# Patient Record
Sex: FEMALE | Race: WHITE | NOT HISPANIC OR LATINO | Employment: FULL TIME | URBAN - METROPOLITAN AREA
[De-identification: names, ages, dates, MRNs, and addresses within clinical notes are randomized per-mention and may not be internally consistent; named-entity substitution may affect disease eponyms.]

---

## 2020-04-21 DIAGNOSIS — Z01.84 ANTIBODY RESPONSE EXAMINATION: ICD-10-CM

## 2020-04-27 ENCOUNTER — LAB VISIT (OUTPATIENT)
Dept: LAB | Facility: HOSPITAL | Age: 35
End: 2020-04-27
Attending: INTERNAL MEDICINE
Payer: COMMERCIAL

## 2020-04-27 DIAGNOSIS — Z01.84 ANTIBODY RESPONSE EXAMINATION: ICD-10-CM

## 2020-04-27 LAB — SARS-COV-2 IGG SERPL QL IA: NEGATIVE

## 2020-04-27 PROCEDURE — 36415 COLL VENOUS BLD VENIPUNCTURE: CPT

## 2020-04-27 PROCEDURE — 86769 SARS-COV-2 COVID-19 ANTIBODY: CPT

## 2020-07-02 ENCOUNTER — CLINICAL SUPPORT (OUTPATIENT)
Dept: URGENT CARE | Facility: CLINIC | Age: 35
End: 2020-07-02
Payer: COMMERCIAL

## 2020-07-02 VITALS
DIASTOLIC BLOOD PRESSURE: 78 MMHG | OXYGEN SATURATION: 99 % | TEMPERATURE: 99 F | RESPIRATION RATE: 16 BRPM | SYSTOLIC BLOOD PRESSURE: 129 MMHG | HEART RATE: 72 BPM

## 2020-07-02 DIAGNOSIS — R50.9 FEVER: ICD-10-CM

## 2020-07-02 PROCEDURE — 99211 PR OFFICE/OUTPT VISIT, EST, LEVL I: ICD-10-PCS | Mod: S$GLB,,, | Performed by: PHYSICIAN ASSISTANT

## 2020-07-02 PROCEDURE — 99211 OFF/OP EST MAY X REQ PHY/QHP: CPT | Mod: S$GLB,,, | Performed by: PHYSICIAN ASSISTANT

## 2020-07-02 PROCEDURE — U0003 INFECTIOUS AGENT DETECTION BY NUCLEIC ACID (DNA OR RNA); SEVERE ACUTE RESPIRATORY SYNDROME CORONAVIRUS 2 (SARS-COV-2) (CORONAVIRUS DISEASE [COVID-19]), AMPLIFIED PROBE TECHNIQUE, MAKING USE OF HIGH THROUGHPUT TECHNOLOGIES AS DESCRIBED BY CMS-2020-01-R: HCPCS

## 2020-07-02 NOTE — PROGRESS NOTES

## 2020-07-06 ENCOUNTER — TELEPHONE (OUTPATIENT)
Dept: URGENT CARE | Facility: CLINIC | Age: 35
End: 2020-07-06

## 2020-07-06 LAB — SARS-COV-2 RNA RESP QL NAA+PROBE: DETECTED

## 2020-07-06 NOTE — TELEPHONE ENCOUNTER
Called pt on behalf of [employer] to discuss COVID-19 result.  No answer, requested that patient call clinic back or call  re: to discuss COVID results

## 2021-05-06 ENCOUNTER — PATIENT MESSAGE (OUTPATIENT)
Dept: RESEARCH | Facility: HOSPITAL | Age: 36
End: 2021-05-06

## 2021-09-23 ENCOUNTER — OFFICE VISIT (OUTPATIENT)
Dept: FAMILY MEDICINE | Facility: CLINIC | Age: 36
End: 2021-09-23
Payer: COMMERCIAL

## 2021-09-23 ENCOUNTER — LAB VISIT (OUTPATIENT)
Dept: LAB | Facility: HOSPITAL | Age: 36
End: 2021-09-23
Attending: NURSE PRACTITIONER
Payer: COMMERCIAL

## 2021-09-23 VITALS
OXYGEN SATURATION: 96 % | HEIGHT: 62 IN | SYSTOLIC BLOOD PRESSURE: 106 MMHG | TEMPERATURE: 98 F | WEIGHT: 127.44 LBS | DIASTOLIC BLOOD PRESSURE: 64 MMHG | HEART RATE: 81 BPM | BODY MASS INDEX: 23.45 KG/M2

## 2021-09-23 DIAGNOSIS — Z76.89 ENCOUNTER TO ESTABLISH CARE: Primary | ICD-10-CM

## 2021-09-23 DIAGNOSIS — Z76.89 ENCOUNTER TO ESTABLISH CARE: ICD-10-CM

## 2021-09-23 LAB
ALBUMIN SERPL BCP-MCNC: 4.4 G/DL (ref 3.5–5.2)
ALP SERPL-CCNC: 41 U/L (ref 55–135)
ALT SERPL W/O P-5'-P-CCNC: 5 U/L (ref 10–44)
ANION GAP SERPL CALC-SCNC: 10 MMOL/L (ref 8–16)
AST SERPL-CCNC: 13 U/L (ref 10–40)
BASOPHILS # BLD AUTO: 0.1 K/UL (ref 0–0.2)
BASOPHILS NFR BLD: 1.5 % (ref 0–1.9)
BILIRUB SERPL-MCNC: 0.8 MG/DL (ref 0.1–1)
BUN SERPL-MCNC: 12 MG/DL (ref 6–20)
CALCIUM SERPL-MCNC: 9.6 MG/DL (ref 8.7–10.5)
CHLORIDE SERPL-SCNC: 103 MMOL/L (ref 95–110)
CHOLEST SERPL-MCNC: 164 MG/DL (ref 120–199)
CHOLEST SERPL-MCNC: 164 MG/DL (ref 120–199)
CHOLEST/HDLC SERPL: 2.4 {RATIO} (ref 2–5)
CHOLEST/HDLC SERPL: 2.4 {RATIO} (ref 2–5)
CO2 SERPL-SCNC: 24 MMOL/L (ref 23–29)
CREAT SERPL-MCNC: 0.8 MG/DL (ref 0.5–1.4)
DIFFERENTIAL METHOD: ABNORMAL
EOSINOPHIL # BLD AUTO: 0.2 K/UL (ref 0–0.5)
EOSINOPHIL NFR BLD: 2.3 % (ref 0–8)
ERYTHROCYTE [DISTWIDTH] IN BLOOD BY AUTOMATED COUNT: 12.6 % (ref 11.5–14.5)
EST. GFR  (AFRICAN AMERICAN): >60 ML/MIN/1.73 M^2
EST. GFR  (NON AFRICAN AMERICAN): >60 ML/MIN/1.73 M^2
GLUCOSE SERPL-MCNC: 83 MG/DL (ref 70–110)
HCT VFR BLD AUTO: 43 % (ref 37–48.5)
HDLC SERPL-MCNC: 67 MG/DL (ref 40–75)
HDLC SERPL-MCNC: 67 MG/DL (ref 40–75)
HDLC SERPL: 40.9 % (ref 20–50)
HDLC SERPL: 40.9 % (ref 20–50)
HGB BLD-MCNC: 13.2 G/DL (ref 12–16)
IMM GRANULOCYTES # BLD AUTO: 0.02 K/UL (ref 0–0.04)
IMM GRANULOCYTES NFR BLD AUTO: 0.3 % (ref 0–0.5)
LDLC SERPL CALC-MCNC: 90 MG/DL (ref 63–159)
LDLC SERPL CALC-MCNC: 90 MG/DL (ref 63–159)
LYMPHOCYTES # BLD AUTO: 1.7 K/UL (ref 1–4.8)
LYMPHOCYTES NFR BLD: 23.9 % (ref 18–48)
MCH RBC QN AUTO: 30.5 PG (ref 27–31)
MCHC RBC AUTO-ENTMCNC: 30.7 G/DL (ref 32–36)
MCV RBC AUTO: 99 FL (ref 82–98)
MONOCYTES # BLD AUTO: 0.6 K/UL (ref 0.3–1)
MONOCYTES NFR BLD: 8.9 % (ref 4–15)
NEUTROPHILS # BLD AUTO: 4.4 K/UL (ref 1.8–7.7)
NEUTROPHILS NFR BLD: 63.1 % (ref 38–73)
NONHDLC SERPL-MCNC: 97 MG/DL
NONHDLC SERPL-MCNC: 97 MG/DL
NRBC BLD-RTO: 0 /100 WBC
PLATELET # BLD AUTO: 328 K/UL (ref 150–450)
PMV BLD AUTO: 9.9 FL (ref 9.2–12.9)
POTASSIUM SERPL-SCNC: 4.5 MMOL/L (ref 3.5–5.1)
PROT SERPL-MCNC: 7.4 G/DL (ref 6–8.4)
RBC # BLD AUTO: 4.33 M/UL (ref 4–5.4)
SODIUM SERPL-SCNC: 137 MMOL/L (ref 136–145)
TRIGL SERPL-MCNC: 35 MG/DL (ref 30–150)
TRIGL SERPL-MCNC: 35 MG/DL (ref 30–150)
TSH SERPL DL<=0.005 MIU/L-ACNC: 0.78 UIU/ML (ref 0.4–4)
WBC # BLD AUTO: 6.89 K/UL (ref 3.9–12.7)

## 2021-09-23 PROCEDURE — 3074F PR MOST RECENT SYSTOLIC BLOOD PRESSURE < 130 MM HG: ICD-10-PCS | Mod: CPTII,S$GLB,, | Performed by: NURSE PRACTITIONER

## 2021-09-23 PROCEDURE — 99203 OFFICE O/P NEW LOW 30 MIN: CPT | Mod: S$GLB,,, | Performed by: NURSE PRACTITIONER

## 2021-09-23 PROCEDURE — 99999 PR PBB SHADOW E&M-EST. PATIENT-LVL III: ICD-10-PCS | Mod: PBBFAC,,, | Performed by: NURSE PRACTITIONER

## 2021-09-23 PROCEDURE — 1159F MED LIST DOCD IN RCRD: CPT | Mod: CPTII,S$GLB,, | Performed by: NURSE PRACTITIONER

## 2021-09-23 PROCEDURE — 80053 COMPREHEN METABOLIC PANEL: CPT | Performed by: NURSE PRACTITIONER

## 2021-09-23 PROCEDURE — 1159F PR MEDICATION LIST DOCUMENTED IN MEDICAL RECORD: ICD-10-PCS | Mod: CPTII,S$GLB,, | Performed by: NURSE PRACTITIONER

## 2021-09-23 PROCEDURE — 84443 ASSAY THYROID STIM HORMONE: CPT | Performed by: NURSE PRACTITIONER

## 2021-09-23 PROCEDURE — 3078F PR MOST RECENT DIASTOLIC BLOOD PRESSURE < 80 MM HG: ICD-10-PCS | Mod: CPTII,S$GLB,, | Performed by: NURSE PRACTITIONER

## 2021-09-23 PROCEDURE — 99999 PR PBB SHADOW E&M-EST. PATIENT-LVL III: CPT | Mod: PBBFAC,,, | Performed by: NURSE PRACTITIONER

## 2021-09-23 PROCEDURE — 80061 LIPID PANEL: CPT | Performed by: NURSE PRACTITIONER

## 2021-09-23 PROCEDURE — 3074F SYST BP LT 130 MM HG: CPT | Mod: CPTII,S$GLB,, | Performed by: NURSE PRACTITIONER

## 2021-09-23 PROCEDURE — 36415 COLL VENOUS BLD VENIPUNCTURE: CPT | Mod: PO | Performed by: NURSE PRACTITIONER

## 2021-09-23 PROCEDURE — 1160F PR REVIEW ALL MEDS BY PRESCRIBER/CLIN PHARMACIST DOCUMENTED: ICD-10-PCS | Mod: CPTII,S$GLB,, | Performed by: NURSE PRACTITIONER

## 2021-09-23 PROCEDURE — 85025 COMPLETE CBC W/AUTO DIFF WBC: CPT | Performed by: NURSE PRACTITIONER

## 2021-09-23 PROCEDURE — 3008F PR BODY MASS INDEX (BMI) DOCUMENTED: ICD-10-PCS | Mod: CPTII,S$GLB,, | Performed by: NURSE PRACTITIONER

## 2021-09-23 PROCEDURE — 99203 PR OFFICE/OUTPT VISIT, NEW, LEVL III, 30-44 MIN: ICD-10-PCS | Mod: S$GLB,,, | Performed by: NURSE PRACTITIONER

## 2021-09-23 PROCEDURE — 3078F DIAST BP <80 MM HG: CPT | Mod: CPTII,S$GLB,, | Performed by: NURSE PRACTITIONER

## 2021-09-23 PROCEDURE — 1160F RVW MEDS BY RX/DR IN RCRD: CPT | Mod: CPTII,S$GLB,, | Performed by: NURSE PRACTITIONER

## 2021-09-23 PROCEDURE — 3008F BODY MASS INDEX DOCD: CPT | Mod: CPTII,S$GLB,, | Performed by: NURSE PRACTITIONER

## 2021-11-02 ENCOUNTER — LAB VISIT (OUTPATIENT)
Dept: PRIMARY CARE CLINIC | Facility: CLINIC | Age: 36
End: 2021-11-02

## 2021-11-02 DIAGNOSIS — Z11.52 ENCOUNTER FOR SCREENING FOR COVID-19: Primary | ICD-10-CM

## 2021-11-02 LAB
CTP QC/QA: YES
SARS-COV-2 AG RESP QL IA.RAPID: NEGATIVE

## 2021-11-09 ENCOUNTER — LAB VISIT (OUTPATIENT)
Dept: PRIMARY CARE CLINIC | Facility: CLINIC | Age: 36
End: 2021-11-09

## 2021-11-09 DIAGNOSIS — Z11.52 ENCOUNTER FOR SCREENING FOR COVID-19: Primary | ICD-10-CM

## 2021-11-09 LAB
CTP QC/QA: YES
SARS-COV-2 AG RESP QL IA.RAPID: NEGATIVE

## 2021-11-15 ENCOUNTER — LAB VISIT (OUTPATIENT)
Dept: PRIMARY CARE CLINIC | Facility: CLINIC | Age: 36
End: 2021-11-15

## 2021-11-15 DIAGNOSIS — Z11.52 ENCOUNTER FOR SCREENING FOR COVID-19: Primary | ICD-10-CM

## 2021-11-15 LAB
CTP QC/QA: YES
SARS-COV-2 AG RESP QL IA.RAPID: NEGATIVE

## 2022-02-01 ENCOUNTER — PATIENT MESSAGE (OUTPATIENT)
Dept: PSYCHIATRY | Facility: CLINIC | Age: 37
End: 2022-02-01
Payer: COMMERCIAL

## 2022-02-11 ENCOUNTER — OCCUPATIONAL HEALTH (OUTPATIENT)
Dept: URGENT CARE | Facility: CLINIC | Age: 37
End: 2022-02-11

## 2022-02-11 PROCEDURE — 99499 PHYSICAL - BASIC COMPLEXITY: ICD-10-PCS | Mod: S$GLB,,, | Performed by: EMERGENCY MEDICINE

## 2022-02-11 PROCEDURE — 99499 UNLISTED E&M SERVICE: CPT | Mod: S$GLB,,, | Performed by: EMERGENCY MEDICINE

## 2023-04-27 ENCOUNTER — LAB VISIT (OUTPATIENT)
Dept: LAB | Facility: HOSPITAL | Age: 38
End: 2023-04-27
Attending: NURSE PRACTITIONER
Payer: COMMERCIAL

## 2023-04-27 ENCOUNTER — OFFICE VISIT (OUTPATIENT)
Dept: FAMILY MEDICINE | Facility: CLINIC | Age: 38
End: 2023-04-27
Payer: COMMERCIAL

## 2023-04-27 VITALS
BODY MASS INDEX: 23.9 KG/M2 | SYSTOLIC BLOOD PRESSURE: 100 MMHG | DIASTOLIC BLOOD PRESSURE: 68 MMHG | WEIGHT: 129.88 LBS | TEMPERATURE: 98 F | HEART RATE: 74 BPM | OXYGEN SATURATION: 98 % | HEIGHT: 62 IN

## 2023-04-27 DIAGNOSIS — Z00.00 ANNUAL PHYSICAL EXAM: Primary | ICD-10-CM

## 2023-04-27 DIAGNOSIS — Z13.1 SCREENING FOR DIABETES MELLITUS: ICD-10-CM

## 2023-04-27 DIAGNOSIS — Z00.00 ANNUAL PHYSICAL EXAM: ICD-10-CM

## 2023-04-27 LAB
ALBUMIN SERPL BCP-MCNC: 4.5 G/DL (ref 3.5–5.2)
ALP SERPL-CCNC: 46 U/L (ref 55–135)
ALT SERPL W/O P-5'-P-CCNC: <5 U/L (ref 10–44)
ANION GAP SERPL CALC-SCNC: 8 MMOL/L (ref 8–16)
AST SERPL-CCNC: 15 U/L (ref 10–40)
BASOPHILS # BLD AUTO: 0.08 K/UL (ref 0–0.2)
BASOPHILS NFR BLD: 1.2 % (ref 0–1.9)
BILIRUB SERPL-MCNC: 1.1 MG/DL (ref 0.1–1)
BUN SERPL-MCNC: 12 MG/DL (ref 6–20)
CALCIUM SERPL-MCNC: 9.6 MG/DL (ref 8.7–10.5)
CHLORIDE SERPL-SCNC: 105 MMOL/L (ref 95–110)
CHOLEST SERPL-MCNC: 178 MG/DL (ref 120–199)
CHOLEST/HDLC SERPL: 2.8 {RATIO} (ref 2–5)
CO2 SERPL-SCNC: 27 MMOL/L (ref 23–29)
CREAT SERPL-MCNC: 0.8 MG/DL (ref 0.5–1.4)
DIFFERENTIAL METHOD: ABNORMAL
EOSINOPHIL # BLD AUTO: 0.1 K/UL (ref 0–0.5)
EOSINOPHIL NFR BLD: 1.8 % (ref 0–8)
ERYTHROCYTE [DISTWIDTH] IN BLOOD BY AUTOMATED COUNT: 12.2 % (ref 11.5–14.5)
EST. GFR  (NO RACE VARIABLE): >60 ML/MIN/1.73 M^2
ESTIMATED AVG GLUCOSE: 103 MG/DL (ref 68–131)
GLUCOSE SERPL-MCNC: 84 MG/DL (ref 70–110)
HBA1C MFR BLD: 5.2 % (ref 4–5.6)
HCT VFR BLD AUTO: 42.9 % (ref 37–48.5)
HCV AB SERPL QL IA: NORMAL
HDLC SERPL-MCNC: 63 MG/DL (ref 40–75)
HDLC SERPL: 35.4 % (ref 20–50)
HGB BLD-MCNC: 13.1 G/DL (ref 12–16)
IMM GRANULOCYTES # BLD AUTO: 0.02 K/UL (ref 0–0.04)
IMM GRANULOCYTES NFR BLD AUTO: 0.3 % (ref 0–0.5)
LDLC SERPL CALC-MCNC: 103.8 MG/DL (ref 63–159)
LYMPHOCYTES # BLD AUTO: 1.2 K/UL (ref 1–4.8)
LYMPHOCYTES NFR BLD: 18.1 % (ref 18–48)
MCH RBC QN AUTO: 30 PG (ref 27–31)
MCHC RBC AUTO-ENTMCNC: 30.5 G/DL (ref 32–36)
MCV RBC AUTO: 98 FL (ref 82–98)
MONOCYTES # BLD AUTO: 0.6 K/UL (ref 0.3–1)
MONOCYTES NFR BLD: 8.3 % (ref 4–15)
NEUTROPHILS # BLD AUTO: 4.7 K/UL (ref 1.8–7.7)
NEUTROPHILS NFR BLD: 70.3 % (ref 38–73)
NONHDLC SERPL-MCNC: 115 MG/DL
NRBC BLD-RTO: 0 /100 WBC
PLATELET # BLD AUTO: 351 K/UL (ref 150–450)
PMV BLD AUTO: 10.1 FL (ref 9.2–12.9)
POTASSIUM SERPL-SCNC: 4.3 MMOL/L (ref 3.5–5.1)
PROT SERPL-MCNC: 7.7 G/DL (ref 6–8.4)
RBC # BLD AUTO: 4.36 M/UL (ref 4–5.4)
SODIUM SERPL-SCNC: 140 MMOL/L (ref 136–145)
TRIGL SERPL-MCNC: 56 MG/DL (ref 30–150)
TSH SERPL DL<=0.005 MIU/L-ACNC: 0.95 UIU/ML (ref 0.4–4)
WBC # BLD AUTO: 6.74 K/UL (ref 3.9–12.7)

## 2023-04-27 PROCEDURE — 3078F DIAST BP <80 MM HG: CPT | Mod: CPTII,S$GLB,, | Performed by: NURSE PRACTITIONER

## 2023-04-27 PROCEDURE — 3074F SYST BP LT 130 MM HG: CPT | Mod: CPTII,S$GLB,, | Performed by: NURSE PRACTITIONER

## 2023-04-27 PROCEDURE — 85025 COMPLETE CBC W/AUTO DIFF WBC: CPT | Performed by: NURSE PRACTITIONER

## 2023-04-27 PROCEDURE — 3074F PR MOST RECENT SYSTOLIC BLOOD PRESSURE < 130 MM HG: ICD-10-PCS | Mod: CPTII,S$GLB,, | Performed by: NURSE PRACTITIONER

## 2023-04-27 PROCEDURE — 87389 HIV-1 AG W/HIV-1&-2 AB AG IA: CPT | Performed by: NURSE PRACTITIONER

## 2023-04-27 PROCEDURE — 83036 HEMOGLOBIN GLYCOSYLATED A1C: CPT | Performed by: NURSE PRACTITIONER

## 2023-04-27 PROCEDURE — 86803 HEPATITIS C AB TEST: CPT | Performed by: NURSE PRACTITIONER

## 2023-04-27 PROCEDURE — 1160F RVW MEDS BY RX/DR IN RCRD: CPT | Mod: CPTII,S$GLB,, | Performed by: NURSE PRACTITIONER

## 2023-04-27 PROCEDURE — 99395 PREV VISIT EST AGE 18-39: CPT | Mod: S$GLB,,, | Performed by: NURSE PRACTITIONER

## 2023-04-27 PROCEDURE — 3008F PR BODY MASS INDEX (BMI) DOCUMENTED: ICD-10-PCS | Mod: CPTII,S$GLB,, | Performed by: NURSE PRACTITIONER

## 2023-04-27 PROCEDURE — 80053 COMPREHEN METABOLIC PANEL: CPT | Performed by: NURSE PRACTITIONER

## 2023-04-27 PROCEDURE — 1159F MED LIST DOCD IN RCRD: CPT | Mod: CPTII,S$GLB,, | Performed by: NURSE PRACTITIONER

## 2023-04-27 PROCEDURE — 1159F PR MEDICATION LIST DOCUMENTED IN MEDICAL RECORD: ICD-10-PCS | Mod: CPTII,S$GLB,, | Performed by: NURSE PRACTITIONER

## 2023-04-27 PROCEDURE — 36415 COLL VENOUS BLD VENIPUNCTURE: CPT | Mod: PO | Performed by: NURSE PRACTITIONER

## 2023-04-27 PROCEDURE — 80061 LIPID PANEL: CPT | Performed by: NURSE PRACTITIONER

## 2023-04-27 PROCEDURE — 99999 PR PBB SHADOW E&M-EST. PATIENT-LVL III: ICD-10-PCS | Mod: PBBFAC,,, | Performed by: NURSE PRACTITIONER

## 2023-04-27 PROCEDURE — 3078F PR MOST RECENT DIASTOLIC BLOOD PRESSURE < 80 MM HG: ICD-10-PCS | Mod: CPTII,S$GLB,, | Performed by: NURSE PRACTITIONER

## 2023-04-27 PROCEDURE — 3008F BODY MASS INDEX DOCD: CPT | Mod: CPTII,S$GLB,, | Performed by: NURSE PRACTITIONER

## 2023-04-27 PROCEDURE — 99999 PR PBB SHADOW E&M-EST. PATIENT-LVL III: CPT | Mod: PBBFAC,,, | Performed by: NURSE PRACTITIONER

## 2023-04-27 PROCEDURE — 1160F PR REVIEW ALL MEDS BY PRESCRIBER/CLIN PHARMACIST DOCUMENTED: ICD-10-PCS | Mod: CPTII,S$GLB,, | Performed by: NURSE PRACTITIONER

## 2023-04-27 PROCEDURE — 84443 ASSAY THYROID STIM HORMONE: CPT | Performed by: NURSE PRACTITIONER

## 2023-04-27 PROCEDURE — 99395 PR PREVENTIVE VISIT,EST,18-39: ICD-10-PCS | Mod: S$GLB,,, | Performed by: NURSE PRACTITIONER

## 2023-04-27 NOTE — PROGRESS NOTES
"Subjective:       Patient ID: Jennifer Tapia is a 38 y.o. female.    Chief Complaint: Annual Exam    HPI      Patient presents today for annual visit. Due for labs. No complaints at today's visit. Is scheduled for pap smear on 6/20/  History reviewed. No pertinent past medical history.    Review of patient's allergies indicates:  No Known Allergies    No current outpatient medications on file.    Review of Systems   Constitutional:  Negative for unexpected weight change.   HENT:  Negative for trouble swallowing.    Eyes:  Negative for visual disturbance.   Respiratory:  Negative for shortness of breath.    Cardiovascular:  Negative for chest pain, palpitations and leg swelling.   Gastrointestinal:  Negative for blood in stool.   Genitourinary:  Negative for hematuria.   Skin:  Negative for rash.   Allergic/Immunologic: Negative for immunocompromised state.   Neurological:  Negative for headaches.   Hematological:  Does not bruise/bleed easily.   Psychiatric/Behavioral:  Negative for agitation. The patient is not nervous/anxious.      Objective:      /68 (BP Location: Left arm, Patient Position: Sitting, BP Method: Small (Manual))   Pulse 74   Temp 98.3 °F (36.8 °C) (Oral)   Ht 5' 2" (1.575 m)   Wt 58.9 kg (129 lb 13.6 oz)   LMP 04/16/2023   SpO2 98%   BMI 23.75 kg/m²   Physical Exam  Constitutional:       Appearance: She is well-developed.   HENT:      Head: Normocephalic.   Cardiovascular:      Rate and Rhythm: Normal rate and regular rhythm.      Heart sounds: Normal heart sounds.   Pulmonary:      Effort: Pulmonary effort is normal.   Musculoskeletal:         General: Normal range of motion.   Skin:     General: Skin is warm and dry.   Neurological:      Mental Status: She is alert and oriented to person, place, and time.   Psychiatric:         Behavior: Behavior normal.         Thought Content: Thought content normal.         Judgment: Judgment normal.       Assessment:       1. Annual physical exam "    2. Screening for diabetes mellitus    3. BMI 23.0-23.9, adult        Plan:       Annual physical exam  -     CBC W/ AUTO DIFFERENTIAL; Future; Expected date: 04/27/2023  -     COMPREHENSIVE METABOLIC PANEL; Future; Expected date: 04/27/2023  -     TSH; Future; Expected date: 04/27/2023  -     Lipid Panel; Future; Expected date: 04/27/2023  -     HEPATITIS C ANTIBODY; Future; Expected date: 04/27/2023  -     HIV 1/2 Ag/Ab (4th Gen); Future; Expected date: 04/27/2023    Screening for diabetes mellitus  -     Hemoglobin A1C; Future; Expected date: 04/27/2023    BMI 23.0-23.9, adult  Stable, continue management    Time spent with patient: 20 minutes    Patient with be reevaluated in 1 year or sooner prn    Greater than 50% of this visit was spent counseling as described in above documentation:Yes

## 2023-04-27 NOTE — PATIENT INSTRUCTIONS
Luis Felipe Rodriguez,     If you are due for any health screening(s) below please notify me so we can arrange them to be ordered and scheduled to maintain your health. Most healthy patients complete it. Don't lose out on improving your health.     Tests to Keep You Healthy    Cervical Cancer Screening: DUE                         April 27, 2023       Jennifer Tapia  [address] [place] [id number]         Dear Jennifer:    Your [facility] Team is dedicated to helping you stay healthy with regularly scheduled recommended screenings.  Scheduling routine screenings is important to maintaining good health. Our records indicate that you may be overdue for your screening pap smear. A pap smear screening can help identify patients at risk for developing cervical cancer at an early stage, when it is most likely to be successfully treated.    We encourage you to schedule your appointment with your womens health provider or some primary care providers also perform this screening.    If you have completed or scheduled your pap smear screening outside of [facility], please notify your primary care team so we can update your health record.      If you have questions or would like to schedule your screening, please contact your primary care clinic.    Sincerely,    Heath Zhu MD and your [facility] Team

## 2023-04-28 LAB — HIV 1+2 AB+HIV1 P24 AG SERPL QL IA: NORMAL

## 2023-05-31 ENCOUNTER — TELEPHONE (OUTPATIENT)
Dept: FAMILY MEDICINE | Facility: CLINIC | Age: 38
End: 2023-05-31
Payer: COMMERCIAL

## 2023-05-31 NOTE — TELEPHONE ENCOUNTER
----- Message from Aaron Mckeon sent at 5/31/2023  9:33 AM CDT -----  Type:  Patient Returning Call    Who Called:  pt  Who Left Message for Patient:  unknown  Does the patient know what this is regarding?:  yes  Best Call Back Number:  [phone] (home)     Additional Information:  please call and advise--thank you

## 2023-06-20 ENCOUNTER — OFFICE VISIT (OUTPATIENT)
Dept: OBSTETRICS AND GYNECOLOGY | Facility: CLINIC | Age: 38
End: 2023-06-20
Payer: COMMERCIAL

## 2023-06-20 VITALS
WEIGHT: 127.44 LBS | SYSTOLIC BLOOD PRESSURE: 122 MMHG | DIASTOLIC BLOOD PRESSURE: 74 MMHG | BODY MASS INDEX: 23.31 KG/M2

## 2023-06-20 DIAGNOSIS — Z01.419 WELL WOMAN EXAM: Primary | ICD-10-CM

## 2023-06-20 PROCEDURE — 99395 PREV VISIT EST AGE 18-39: CPT | Mod: S$GLB,,, | Performed by: STUDENT IN AN ORGANIZED HEALTH CARE EDUCATION/TRAINING PROGRAM

## 2023-06-20 PROCEDURE — 3044F HG A1C LEVEL LT 7.0%: CPT | Mod: CPTII,S$GLB,, | Performed by: STUDENT IN AN ORGANIZED HEALTH CARE EDUCATION/TRAINING PROGRAM

## 2023-06-20 PROCEDURE — 3008F PR BODY MASS INDEX (BMI) DOCUMENTED: ICD-10-PCS | Mod: CPTII,S$GLB,, | Performed by: STUDENT IN AN ORGANIZED HEALTH CARE EDUCATION/TRAINING PROGRAM

## 2023-06-20 PROCEDURE — 99395 PR PREVENTIVE VISIT,EST,18-39: ICD-10-PCS | Mod: S$GLB,,, | Performed by: STUDENT IN AN ORGANIZED HEALTH CARE EDUCATION/TRAINING PROGRAM

## 2023-06-20 PROCEDURE — 1159F PR MEDICATION LIST DOCUMENTED IN MEDICAL RECORD: ICD-10-PCS | Mod: CPTII,S$GLB,, | Performed by: STUDENT IN AN ORGANIZED HEALTH CARE EDUCATION/TRAINING PROGRAM

## 2023-06-20 PROCEDURE — 88141 CYTOPATH C/V INTERPRET: CPT | Mod: ,,, | Performed by: PATHOLOGY

## 2023-06-20 PROCEDURE — 3078F DIAST BP <80 MM HG: CPT | Mod: CPTII,S$GLB,, | Performed by: STUDENT IN AN ORGANIZED HEALTH CARE EDUCATION/TRAINING PROGRAM

## 2023-06-20 PROCEDURE — 3074F SYST BP LT 130 MM HG: CPT | Mod: CPTII,S$GLB,, | Performed by: STUDENT IN AN ORGANIZED HEALTH CARE EDUCATION/TRAINING PROGRAM

## 2023-06-20 PROCEDURE — 99999 PR PBB SHADOW E&M-EST. PATIENT-LVL III: CPT | Mod: PBBFAC,,, | Performed by: STUDENT IN AN ORGANIZED HEALTH CARE EDUCATION/TRAINING PROGRAM

## 2023-06-20 PROCEDURE — 3078F PR MOST RECENT DIASTOLIC BLOOD PRESSURE < 80 MM HG: ICD-10-PCS | Mod: CPTII,S$GLB,, | Performed by: STUDENT IN AN ORGANIZED HEALTH CARE EDUCATION/TRAINING PROGRAM

## 2023-06-20 PROCEDURE — 1159F MED LIST DOCD IN RCRD: CPT | Mod: CPTII,S$GLB,, | Performed by: STUDENT IN AN ORGANIZED HEALTH CARE EDUCATION/TRAINING PROGRAM

## 2023-06-20 PROCEDURE — 99999 PR PBB SHADOW E&M-EST. PATIENT-LVL III: ICD-10-PCS | Mod: PBBFAC,,, | Performed by: STUDENT IN AN ORGANIZED HEALTH CARE EDUCATION/TRAINING PROGRAM

## 2023-06-20 PROCEDURE — 3008F BODY MASS INDEX DOCD: CPT | Mod: CPTII,S$GLB,, | Performed by: STUDENT IN AN ORGANIZED HEALTH CARE EDUCATION/TRAINING PROGRAM

## 2023-06-20 PROCEDURE — 3074F PR MOST RECENT SYSTOLIC BLOOD PRESSURE < 130 MM HG: ICD-10-PCS | Mod: CPTII,S$GLB,, | Performed by: STUDENT IN AN ORGANIZED HEALTH CARE EDUCATION/TRAINING PROGRAM

## 2023-06-20 PROCEDURE — 88141 PR  CYTOPATH CERV/VAG INTERPRET: ICD-10-PCS | Mod: ,,, | Performed by: PATHOLOGY

## 2023-06-20 PROCEDURE — 3044F PR MOST RECENT HEMOGLOBIN A1C LEVEL <7.0%: ICD-10-PCS | Mod: CPTII,S$GLB,, | Performed by: STUDENT IN AN ORGANIZED HEALTH CARE EDUCATION/TRAINING PROGRAM

## 2023-06-20 PROCEDURE — 88175 CYTOPATH C/V AUTO FLUID REDO: CPT | Performed by: PATHOLOGY

## 2023-06-20 PROCEDURE — 87624 HPV HI-RISK TYP POOLED RSLT: CPT | Performed by: STUDENT IN AN ORGANIZED HEALTH CARE EDUCATION/TRAINING PROGRAM

## 2023-06-20 NOTE — PROGRESS NOTES
History & Physical  Gynecology      SUBJECTIVE:     Chief Complaint: Establish Care and Annual Exam       History of Present Illness:    Here today for well woman exam, no complaints.     Gyn: regular cycles, sexually active, vasectomy for contraception. No hx of abnormal pap smears. No immediate FH of gyn or colon cancer    No tobacco    Cardiac RN dayshift     Review of patient's allergies indicates:  No Known Allergies    History reviewed. No pertinent past medical history.  Past Surgical History:   Procedure Laterality Date    BELT ABDOMINOPLASTY  10/2005    breast implants  10/2005    RHINOPLASTY TIP  2008     OB History          4    Para   3    Term   2       1    AB   1    Living   3         SAB        IAB        Ectopic        Multiple        Live Births   3               Family History   Problem Relation Age of Onset    Arthritis Maternal Grandmother     Depression Father     Mental illness Father     Hyperlipidemia Mother     Breast cancer Neg Hx     Ovarian cancer Neg Hx     Uterine cancer Neg Hx     Cervical cancer Neg Hx      Social History     Tobacco Use    Smoking status: Never    Smokeless tobacco: Never   Substance Use Topics    Alcohol use: Yes     Comment: Social    Drug use: No       No current outpatient medications on file.     No current facility-administered medications for this visit.         Review of Systems:  Review of Systems   Constitutional:  Negative for chills, fatigue and fever.   HENT:  Negative for congestion.    Eyes:  Negative for visual disturbance.   Respiratory:  Negative for cough and shortness of breath.    Cardiovascular:  Negative for chest pain and palpitations.   Gastrointestinal:  Negative for abdominal distention, abdominal pain, constipation, diarrhea, nausea and vomiting.   Genitourinary:  Negative for difficulty urinating, dysuria, hematuria, vaginal bleeding and vaginal discharge.   Skin:  Negative for rash.   Neurological:  Negative for dizziness,  seizures, light-headedness and headaches.   Hematological:  Does not bruise/bleed easily.   Psychiatric/Behavioral:  Negative for dysphoric mood. The patient is not nervous/anxious.       OBJECTIVE:     Physical Exam:  Physical Exam  Vitals reviewed.   Constitutional:       General: She is not in acute distress.     Appearance: Normal appearance. She is well-developed.   HENT:      Head: Normocephalic and atraumatic.   Cardiovascular:      Rate and Rhythm: Normal rate and regular rhythm.   Pulmonary:      Effort: Pulmonary effort is normal.   Chest:   Breasts:     Right: No inverted nipple, mass, nipple discharge, skin change or tenderness.      Left: No inverted nipple, mass, nipple discharge, skin change or tenderness.   Abdominal:      General: There is no distension.      Palpations: Abdomen is soft.      Tenderness: There is no abdominal tenderness.   Genitourinary:     Vagina: Normal.      Comments: Normal external female genitalia, normal hair distribution. Vaginal mucosa pink, moist, well rugated, scant white physiologic discharge. No blood in vault. Cervix pink, non-friable, without lesion. No CMT. Uterus non tender, mobile, not enlarged. Adnexa without fullness or tenderness.    Skin:     General: Skin is warm.   Neurological:      Mental Status: She is alert and oriented to person, place, and time.   Psychiatric:         Behavior: Behavior normal.         Thought Content: Thought content normal.         Judgment: Judgment normal.         ASSESSMENT:       ICD-10-CM ICD-9-CM    1. Well woman exam  Z01.419 V72.31 Liquid-Based Pap Smear, Screening      HPV High Risk Genotypes, PCR          Orders Placed This Encounter   Procedures    HPV High Risk Genotypes, PCR     Order Specific Question:   Source     Answer:   Cervix     Order Specific Question:   Release to patient     Answer:   Immediate           Plan:      Well woman:  - Pap cotest  - MMG @40  - colonoscopy @ 47  - tobacco cessation n/a  -  contraception vasectomy  - HPV vaccine hasn't had, pending pap   - Counseled to take daily multivitamin. If patient is of reproductive age and not on contraception, to take prenatal vitamin. Patient has been counseled on the vitamin D and calcium requirements per ACOG recommendations.  Age   Calcium(mg/day)   Vitamin D (IU/day)  9-18    1300                       600  19-50  1000                       600  51-70  1200                       600  >70      1,200                      800  Patient to start PNV    Samara Gandara M.D.  Obstetrics and Gynecology

## 2023-06-23 ENCOUNTER — OFFICE VISIT (OUTPATIENT)
Dept: OPTOMETRY | Facility: CLINIC | Age: 38
End: 2023-06-23
Payer: COMMERCIAL

## 2023-06-23 DIAGNOSIS — Z01.00 EXAMINATION OF EYES AND VISION: Primary | ICD-10-CM

## 2023-06-23 DIAGNOSIS — H52.7 REFRACTIVE ERROR: ICD-10-CM

## 2023-06-23 DIAGNOSIS — Z46.0 CONTACT LENS/GLASSES FITTING: Primary | ICD-10-CM

## 2023-06-23 PROCEDURE — 92004 PR EYE EXAM, NEW PATIENT,COMPREHESV: ICD-10-PCS | Mod: S$GLB,,, | Performed by: OPTOMETRIST

## 2023-06-23 PROCEDURE — 92015 DETERMINE REFRACTIVE STATE: CPT | Mod: S$GLB,,, | Performed by: OPTOMETRIST

## 2023-06-23 PROCEDURE — 92310 PR CONTACT LENS FITTING (NO CHANGE): ICD-10-PCS | Mod: CSM,S$GLB,, | Performed by: OPTOMETRIST

## 2023-06-23 PROCEDURE — 99999 PR PBB SHADOW E&M-EST. PATIENT-LVL II: CPT | Mod: PBBFAC,,, | Performed by: OPTOMETRIST

## 2023-06-23 PROCEDURE — 99499 NO LOS: ICD-10-PCS | Mod: ,,, | Performed by: OPTOMETRIST

## 2023-06-23 PROCEDURE — 92310 CONTACT LENS FITTING OU: CPT | Mod: CSM,S$GLB,, | Performed by: OPTOMETRIST

## 2023-06-23 PROCEDURE — 99499 UNLISTED E&M SERVICE: CPT | Mod: ,,, | Performed by: OPTOMETRIST

## 2023-06-23 PROCEDURE — 92015 PR REFRACTION: ICD-10-PCS | Mod: S$GLB,,, | Performed by: OPTOMETRIST

## 2023-06-23 PROCEDURE — 99999 PR PBB SHADOW E&M-EST. PATIENT-LVL II: ICD-10-PCS | Mod: PBBFAC,,, | Performed by: OPTOMETRIST

## 2023-06-23 PROCEDURE — 92004 COMPRE OPH EXAM NEW PT 1/>: CPT | Mod: S$GLB,,, | Performed by: OPTOMETRIST

## 2023-06-23 NOTE — PROGRESS NOTES
Assessment /Plan     For exam results, see Encounter Report.    Contact lens/glasses fitting      Patient is here for a comprehensive eye exam and contact lens fit. See other exam visit with same encounter date 06/23/2023 for detailed exam information.

## 2023-06-23 NOTE — PROGRESS NOTES
HPI    Pt here today for annual exam.   States blurred vision at distance OD   only, OS vision good.  Would like clfit today --- new wearer.    Denies any headaches or eye pain.    (-) allergies / dry eyes  (-) gtts  (-) floaters or light flashes    BRITT ~ 1 year at [facility]  Last edited by Nestor Saenz, OD on 6/23/2023 10:50 AM.            Assessment /Plan     For exam results, see Encounter Report.    Examination of eyes and vision    Refractive error      1. Examination of eyes and vision  Good ocular health OU    2. Refractive error  Dispensed updated spectacle Rx. Discussed various spectacle lens options. Discussed adaptation period to new specs.   Discussed cls options -- first time wearer, would like to wear every day  Ordered biofinity toric, return for cls dispense and I&R

## 2023-06-26 LAB
FINAL PATHOLOGIC DIAGNOSIS: ABNORMAL
Lab: ABNORMAL

## 2023-06-28 ENCOUNTER — PATIENT MESSAGE (OUTPATIENT)
Dept: OPHTHALMOLOGY | Facility: CLINIC | Age: 38
End: 2023-06-28
Payer: COMMERCIAL

## 2023-06-28 LAB
HPV HR 12 DNA SPEC QL NAA+PROBE: NEGATIVE
HPV16 AG SPEC QL: NEGATIVE
HPV18 DNA SPEC QL NAA+PROBE: NEGATIVE

## 2023-07-05 ENCOUNTER — OFFICE VISIT (OUTPATIENT)
Dept: OPTOMETRY | Facility: CLINIC | Age: 38
End: 2023-07-05
Payer: COMMERCIAL

## 2023-07-05 DIAGNOSIS — Z97.3 WEARS CONTACT LENSES: Primary | ICD-10-CM

## 2023-07-05 PROCEDURE — 99499 NO LOS: ICD-10-PCS | Mod: S$GLB,,, | Performed by: OPTOMETRIST

## 2023-07-05 PROCEDURE — 99499 UNLISTED E&M SERVICE: CPT | Mod: S$GLB,,, | Performed by: OPTOMETRIST

## 2023-07-05 NOTE — PROGRESS NOTES
HPI    Pt here today for cls follow up.   Inserted trials OU for pt, new wearer   -- will need I & R today.  States good fit & comfort both physically & visually.  OS vision sharper then OD.  Last edited by Denita Aguero on 7/5/2023 10:16 AM.            Assessment /Plan     For exam results, see Encounter Report.    Wears contact lenses      Good initial clfu -- pt reports overall good comfort and vision  Successfully completed I&R in office   Dispensed CLs trials: connor stokes. Discussed proper hand hygiene and wear/care of lenses. Daily wear only, dispose of monthly. Do not sleep/swim/shower in lenses. Discontinue CL wear ASAP and RTC if any redness or discomfort occurs.   Return for clfu in 3 weeks prn -- ok to finalize if happy

## 2024-02-14 ENCOUNTER — PATIENT MESSAGE (OUTPATIENT)
Dept: FAMILY MEDICINE | Facility: CLINIC | Age: 39
End: 2024-02-14
Payer: COMMERCIAL

## 2024-06-28 ENCOUNTER — OFFICE VISIT (OUTPATIENT)
Dept: FAMILY MEDICINE | Facility: CLINIC | Age: 39
End: 2024-06-28
Payer: COMMERCIAL

## 2024-06-28 VITALS
OXYGEN SATURATION: 99 % | BODY MASS INDEX: 23.61 KG/M2 | WEIGHT: 128.31 LBS | SYSTOLIC BLOOD PRESSURE: 106 MMHG | TEMPERATURE: 98 F | DIASTOLIC BLOOD PRESSURE: 58 MMHG | HEART RATE: 89 BPM | HEIGHT: 62 IN

## 2024-06-28 DIAGNOSIS — Z13.1 DIABETES MELLITUS SCREENING: ICD-10-CM

## 2024-06-28 DIAGNOSIS — Z13.0 SCREENING FOR DEFICIENCY ANEMIA: ICD-10-CM

## 2024-06-28 DIAGNOSIS — Z13.29 THYROID DISORDER SCREENING: ICD-10-CM

## 2024-06-28 DIAGNOSIS — Z13.220 NEED FOR LIPID SCREENING: ICD-10-CM

## 2024-06-28 DIAGNOSIS — Z00.00 ANNUAL PHYSICAL EXAM: Primary | ICD-10-CM

## 2024-06-28 PROCEDURE — 99999 PR PBB SHADOW E&M-EST. PATIENT-LVL III: CPT | Mod: PBBFAC,,, | Performed by: FAMILY MEDICINE

## 2024-06-28 NOTE — PROGRESS NOTES
Subjective:       Patient ID: Jennifer Tapia is a 39 y.o. female.    Chief Complaint: Annual Exam    This patient is new to me.  Jennifer Tapia presents to the clinic today for annual physical exam. Patient states she is doing well with no complaints today.   Patient educated on plan of care, verbalized understanding.         Review of Systems   Constitutional:  Negative for activity change, appetite change, chills, diaphoresis and fever.   HENT:  Negative for congestion, ear pain, postnasal drip, sinus pressure, sneezing and sore throat.    Eyes:  Negative for pain, discharge, redness and itching.   Respiratory:  Negative for apnea, cough, chest tightness, shortness of breath and wheezing.    Cardiovascular:  Negative for chest pain and leg swelling.   Gastrointestinal:  Negative for abdominal distention, abdominal pain, constipation, diarrhea, nausea and vomiting.   Genitourinary:  Negative for difficulty urinating, dysuria, flank pain and frequency.   Skin:  Negative for color change, rash and wound.   Neurological:  Negative for dizziness.   All other systems reviewed and are negative.      Patient Active Problem List   Diagnosis    Cyst of Varna's duct    Varna's gland abscess    Wears contact lenses       Objective:      Physical Exam  Vitals reviewed.   Constitutional:       General: She is not in acute distress.     Appearance: Normal appearance. She is well-developed.   HENT:      Head: Normocephalic.      Nose: Nose normal.   Eyes:      Conjunctiva/sclera: Conjunctivae normal.      Pupils: Pupils are equal, round, and reactive to light.   Cardiovascular:      Rate and Rhythm: Normal rate and regular rhythm.   Pulmonary:      Effort: Pulmonary effort is normal. No respiratory distress.   Musculoskeletal:      Cervical back: Normal range of motion and neck supple.   Skin:     General: Skin is warm and dry.      Findings: No rash.   Neurological:      Mental Status: She is alert and oriented to person, place,  "and time.   Psychiatric:         Mood and Affect: Mood normal.         Behavior: Behavior normal.         Lab Results   Component Value Date    WBC 6.74 04/27/2023    HGB 13.1 04/27/2023    HCT 42.9 04/27/2023     04/27/2023    CHOL 178 04/27/2023    TRIG 56 04/27/2023    HDL 63 04/27/2023    ALT <5 (L) 04/27/2023    AST 15 04/27/2023     04/27/2023    K 4.3 04/27/2023     04/27/2023    CREATININE 0.8 04/27/2023    BUN 12 04/27/2023    CO2 27 04/27/2023    TSH 0.955 04/27/2023    HGBA1C 5.2 04/27/2023     The ASCVD Risk score (Deonte CARVALHO, et al., 2019) failed to calculate for the following reasons:    The 2019 ASCVD risk score is only valid for ages 40 to 79  Visit Vitals  BP (!) 106/58 (BP Location: Right arm, Patient Position: Sitting, BP Method: Medium (Manual))   Pulse 89   Temp 98.4 °F (36.9 °C) (Oral)   Ht 5' 2" (1.575 m)   Wt 58.2 kg (128 lb 4.9 oz)   LMP 06/14/2024 (Exact Date)   SpO2 99%   BMI 23.47 kg/m²      Assessment:       1. Annual physical exam    2. BMI 23.0-23.9, adult    3. Screening for deficiency anemia    4. Thyroid disorder screening    5. Diabetes mellitus screening    6. Need for lipid screening        Plan:       Jennifer was seen today for annual exam.    Diagnoses and all orders for this visit:    Annual physical exam / BMI 23.0-23.9, adult  -     CBC Auto Differential; Future  -     Comprehensive Metabolic Panel; Future  -     Hemoglobin A1C; Future  -     TSH; Future  -     T4, Free; Future  -     Lipid Panel; Future  Body mass index is 23.47 kg/m².  Continue healthy diet and regular exercise as tolerated.  Continue medications as prescribed.  Follow up with PCP     Screening for deficiency anemia  -     CBC Auto Differential; Future    Thyroid disorder screening  -     TSH; Future  -     T4, Free; Future    Diabetes mellitus screening  -     Comprehensive Metabolic Panel; Future  -     Hemoglobin A1C; Future    Need for lipid screening  -     Lipid Panel; Future     "   Follow up if symptoms worsen or fail to improve.      Future Appointments       Date Provider Specialty Appt Notes    7/8/2024 Samara Gandara MD Obstetrics and Gynecology Yearly    8/16/2024 Nestor Saenz OD Optometry Annual             All of your core healthy metrics are met.

## 2024-06-29 ENCOUNTER — LAB VISIT (OUTPATIENT)
Dept: LAB | Facility: HOSPITAL | Age: 39
End: 2024-06-29
Attending: FAMILY MEDICINE
Payer: COMMERCIAL

## 2024-06-29 DIAGNOSIS — Z13.1 DIABETES MELLITUS SCREENING: ICD-10-CM

## 2024-06-29 DIAGNOSIS — Z13.29 THYROID DISORDER SCREENING: ICD-10-CM

## 2024-06-29 DIAGNOSIS — Z13.0 SCREENING FOR DEFICIENCY ANEMIA: ICD-10-CM

## 2024-06-29 DIAGNOSIS — Z00.00 ANNUAL PHYSICAL EXAM: ICD-10-CM

## 2024-06-29 DIAGNOSIS — Z13.220 NEED FOR LIPID SCREENING: ICD-10-CM

## 2024-06-29 LAB
ALBUMIN SERPL BCP-MCNC: 4.2 G/DL (ref 3.5–5.2)
ALP SERPL-CCNC: 43 U/L (ref 55–135)
ALT SERPL W/O P-5'-P-CCNC: 6 U/L (ref 10–44)
ANION GAP SERPL CALC-SCNC: 8 MMOL/L (ref 8–16)
AST SERPL-CCNC: 15 U/L (ref 10–40)
BASOPHILS # BLD AUTO: 0.09 K/UL (ref 0–0.2)
BASOPHILS NFR BLD: 1.7 % (ref 0–1.9)
BILIRUB SERPL-MCNC: 0.7 MG/DL (ref 0.1–1)
BUN SERPL-MCNC: 13 MG/DL (ref 6–20)
CALCIUM SERPL-MCNC: 9.5 MG/DL (ref 8.7–10.5)
CHLORIDE SERPL-SCNC: 104 MMOL/L (ref 95–110)
CHOLEST SERPL-MCNC: 165 MG/DL (ref 120–199)
CHOLEST/HDLC SERPL: 2.8 {RATIO} (ref 2–5)
CO2 SERPL-SCNC: 25 MMOL/L (ref 23–29)
CREAT SERPL-MCNC: 0.8 MG/DL (ref 0.5–1.4)
DIFFERENTIAL METHOD BLD: ABNORMAL
EOSINOPHIL # BLD AUTO: 0.2 K/UL (ref 0–0.5)
EOSINOPHIL NFR BLD: 3.9 % (ref 0–8)
ERYTHROCYTE [DISTWIDTH] IN BLOOD BY AUTOMATED COUNT: 12.4 % (ref 11.5–14.5)
EST. GFR  (NO RACE VARIABLE): >60 ML/MIN/1.73 M^2
ESTIMATED AVG GLUCOSE: 100 MG/DL (ref 68–131)
GLUCOSE SERPL-MCNC: 93 MG/DL (ref 70–110)
HBA1C MFR BLD: 5.1 % (ref 4.5–6.2)
HCT VFR BLD AUTO: 39.3 % (ref 37–48.5)
HDLC SERPL-MCNC: 60 MG/DL (ref 40–75)
HDLC SERPL: 36.4 % (ref 20–50)
HGB BLD-MCNC: 12.5 G/DL (ref 12–16)
IMM GRANULOCYTES # BLD AUTO: 0.01 K/UL (ref 0–0.04)
IMM GRANULOCYTES NFR BLD AUTO: 0.2 % (ref 0–0.5)
LDLC SERPL CALC-MCNC: 95.4 MG/DL (ref 63–159)
LYMPHOCYTES # BLD AUTO: 1.6 K/UL (ref 1–4.8)
LYMPHOCYTES NFR BLD: 29.8 % (ref 18–48)
MCH RBC QN AUTO: 31 PG (ref 27–31)
MCHC RBC AUTO-ENTMCNC: 31.8 G/DL (ref 32–36)
MCV RBC AUTO: 98 FL (ref 82–98)
MONOCYTES # BLD AUTO: 0.5 K/UL (ref 0.3–1)
MONOCYTES NFR BLD: 9.2 % (ref 4–15)
NEUTROPHILS # BLD AUTO: 3 K/UL (ref 1.8–7.7)
NEUTROPHILS NFR BLD: 55.2 % (ref 38–73)
NONHDLC SERPL-MCNC: 105 MG/DL
NRBC BLD-RTO: 0 /100 WBC
PLATELET # BLD AUTO: 313 K/UL (ref 150–450)
PMV BLD AUTO: 9.4 FL (ref 9.2–12.9)
POTASSIUM SERPL-SCNC: 4.1 MMOL/L (ref 3.5–5.1)
PROT SERPL-MCNC: 7.4 G/DL (ref 6–8.4)
RBC # BLD AUTO: 4.03 M/UL (ref 4–5.4)
SODIUM SERPL-SCNC: 137 MMOL/L (ref 136–145)
T4 FREE SERPL-MCNC: 0.97 NG/DL (ref 0.71–1.51)
TRIGL SERPL-MCNC: 48 MG/DL (ref 30–150)
TSH SERPL DL<=0.005 MIU/L-ACNC: 1.28 UIU/ML (ref 0.4–4)
WBC # BLD AUTO: 5.34 K/UL (ref 3.9–12.7)

## 2024-06-29 PROCEDURE — 84439 ASSAY OF FREE THYROXINE: CPT | Performed by: FAMILY MEDICINE

## 2024-06-29 PROCEDURE — 36415 COLL VENOUS BLD VENIPUNCTURE: CPT | Performed by: FAMILY MEDICINE

## 2024-06-29 PROCEDURE — 83036 HEMOGLOBIN GLYCOSYLATED A1C: CPT | Performed by: FAMILY MEDICINE

## 2024-06-29 PROCEDURE — 80053 COMPREHEN METABOLIC PANEL: CPT | Performed by: FAMILY MEDICINE

## 2024-06-29 PROCEDURE — 85025 COMPLETE CBC W/AUTO DIFF WBC: CPT | Performed by: FAMILY MEDICINE

## 2024-06-29 PROCEDURE — 80061 LIPID PANEL: CPT | Performed by: FAMILY MEDICINE

## 2024-06-29 PROCEDURE — 84443 ASSAY THYROID STIM HORMONE: CPT | Performed by: FAMILY MEDICINE

## 2024-07-08 ENCOUNTER — OFFICE VISIT (OUTPATIENT)
Dept: OBSTETRICS AND GYNECOLOGY | Facility: CLINIC | Age: 39
End: 2024-07-08
Payer: COMMERCIAL

## 2024-07-08 VITALS
DIASTOLIC BLOOD PRESSURE: 60 MMHG | BODY MASS INDEX: 24.19 KG/M2 | SYSTOLIC BLOOD PRESSURE: 102 MMHG | WEIGHT: 132.25 LBS

## 2024-07-08 DIAGNOSIS — Z12.4 SCREENING FOR CERVICAL CANCER: Primary | ICD-10-CM

## 2024-07-08 DIAGNOSIS — N94.9 ADNEXAL FULLNESS: ICD-10-CM

## 2024-07-08 PROCEDURE — 99999 PR PBB SHADOW E&M-EST. PATIENT-LVL III: CPT | Mod: PBBFAC,,, | Performed by: STUDENT IN AN ORGANIZED HEALTH CARE EDUCATION/TRAINING PROGRAM

## 2024-07-08 PROCEDURE — 87624 HPV HI-RISK TYP POOLED RSLT: CPT | Performed by: STUDENT IN AN ORGANIZED HEALTH CARE EDUCATION/TRAINING PROGRAM

## 2024-07-08 NOTE — PROGRESS NOTES
History & Physical  Gynecology      SUBJECTIVE:     Chief Complaint: Annual Exam       History of Present Illness:    Here today for well woman exam, no complaints.     Gyn: regular cycles, sexually active, vasectomy for contraception. No hx of abnormal pap smears. Last pap ASCUS HPV negNo immediate FH of gyn or colon cancer    No tobacco    Cardiac RN dayshift     Just got back from [place]    Review of patient's allergies indicates:  No Known Allergies    History reviewed. No pertinent past medical history.  Past Surgical History:   Procedure Laterality Date    BELT ABDOMINOPLASTY  10/2005    breast implants  10/2005    RHINOPLASTY TIP  2008     OB History          4    Para   3    Term   2       1    AB   1    Living   3         SAB        IAB        Ectopic        Multiple        Live Births   3               Family History   Problem Relation Name Age of Onset    Arthritis Maternal Grandmother      Glaucoma Maternal Grandmother      Depression Father      Mental illness Father      Hyperlipidemia Mother      Breast cancer Neg Hx      Ovarian cancer Neg Hx      Uterine cancer Neg Hx      Cervical cancer Neg Hx      Colon cancer Neg Hx       Social History     Tobacco Use    Smoking status: Never    Smokeless tobacco: Never   Substance Use Topics    Alcohol use: Yes     Comment: Social    Drug use: No       No current outpatient medications on file.     No current facility-administered medications for this visit.         Review of Systems:  Review of Systems   Constitutional:  Negative for chills, fatigue and fever.   HENT:  Negative for congestion.    Eyes:  Negative for visual disturbance.   Respiratory:  Negative for cough and shortness of breath.    Cardiovascular:  Negative for chest pain and palpitations.   Gastrointestinal:  Negative for abdominal distention, abdominal pain, constipation, diarrhea, nausea and vomiting.   Genitourinary:  Negative for difficulty urinating, dysuria, hematuria,  vaginal bleeding and vaginal discharge.   Skin:  Negative for rash.   Neurological:  Negative for dizziness, seizures, light-headedness and headaches.   Hematological:  Does not bruise/bleed easily.   Psychiatric/Behavioral:  Negative for dysphoric mood. The patient is not nervous/anxious.         OBJECTIVE:     Physical Exam:  Physical Exam  Vitals reviewed.   Constitutional:       General: She is not in acute distress.     Appearance: Normal appearance. She is well-developed.   HENT:      Head: Normocephalic and atraumatic.   Cardiovascular:      Rate and Rhythm: Normal rate and regular rhythm.   Pulmonary:      Effort: Pulmonary effort is normal.   Chest:   Breasts:     Right: No inverted nipple, mass, nipple discharge, skin change or tenderness.      Left: No inverted nipple, mass, nipple discharge, skin change or tenderness.   Abdominal:      General: There is no distension.      Palpations: Abdomen is soft.      Tenderness: There is no abdominal tenderness.   Genitourinary:     Vagina: Normal.      Comments: Normal external female genitalia, normal hair distribution. Vaginal mucosa pink, moist, well rugated, scant white physiologic discharge. No blood in vault. Cervix pink, non-friable, without lesion. ++ posterior fibroid vs adnexal fullness     Skin:     General: Skin is warm.   Neurological:      Mental Status: She is alert and oriented to person, place, and time.   Psychiatric:         Behavior: Behavior normal.         Thought Content: Thought content normal.         Judgment: Judgment normal.           ASSESSMENT:       ICD-10-CM ICD-9-CM    1. Screening for cervical cancer  Z12.4 V76.2 Liquid-Based Pap Smear, Screening      HPV High Risk Genotypes, PCR      2. Adnexal fullness  N94.9 625.8 US Pelvis Comp with Transvag NON-OB (xpd          Orders Placed This Encounter   Procedures    HPV High Risk Genotypes, PCR     Order Specific Question:   Source     Answer:   Cervix     Order Specific Question:    Release to patient     Answer:   Immediate    US Pelvis Comp with Transvag NON-OB (xpd     Standing Status:   Future     Standing Expiration Date:   7/8/2025     Order Specific Question:   May the Radiologist modify the order per protocol to meet the clinical needs of the patient?     Answer:   Yes     Order Specific Question:   Release to patient     Answer:   Immediate           Plan:      Well woman:  - Pap cotest today   - MMG @42  - colonoscopy @ 47  - tobacco cessation n/a  - contraception vasectomy  - Counseled to take daily multivitamin. If patient is of reproductive age and not on contraception, to take prenatal vitamin. Patient has been counseled on the vitamin D and calcium requirements per ACOG recommendations.  Age   Calcium(mg/day)   Vitamin D (IU/day)  9-18    1300                       600  19-50  1000                       600  51-70  1200                       600  >70      1,200                      800  Patient to start PNV    Adnexal fullness  - US ordered     Samara Gandara M.D.  Obstetrics and Gynecology

## 2024-07-25 ENCOUNTER — HOSPITAL ENCOUNTER (OUTPATIENT)
Dept: RADIOLOGY | Facility: HOSPITAL | Age: 39
Discharge: HOME | End: 2024-07-25
Attending: STUDENT IN AN ORGANIZED HEALTH CARE EDUCATION/TRAINING PROGRAM
Payer: COMMERCIAL

## 2024-07-25 DIAGNOSIS — N94.9 ADNEXAL FULLNESS: ICD-10-CM

## 2024-07-25 PROCEDURE — 76856 US EXAM PELVIC COMPLETE: CPT | Mod: TC

## 2024-07-25 PROCEDURE — 76830 TRANSVAGINAL US NON-OB: CPT | Mod: 26,,, | Performed by: RADIOLOGY

## 2024-07-25 PROCEDURE — 76856 US EXAM PELVIC COMPLETE: CPT | Mod: 26,,, | Performed by: RADIOLOGY

## 2024-08-15 ENCOUNTER — OFFICE VISIT (OUTPATIENT)
Dept: OPTOMETRY | Facility: CLINIC | Age: 39
End: 2024-08-15
Payer: COMMERCIAL

## 2024-08-15 DIAGNOSIS — H35.411 LATTICE DEGENERATION OF PERIPHERAL RETINA, RIGHT: ICD-10-CM

## 2024-08-15 DIAGNOSIS — H52.7 REFRACTIVE ERROR: ICD-10-CM

## 2024-08-15 DIAGNOSIS — Z01.00 EXAMINATION OF EYES AND VISION: Primary | ICD-10-CM

## 2024-08-15 PROCEDURE — 92014 COMPRE OPH EXAM EST PT 1/>: CPT | Mod: S$GLB,,, | Performed by: OPTOMETRIST

## 2024-08-15 PROCEDURE — 92015 DETERMINE REFRACTIVE STATE: CPT | Mod: S$GLB,,, | Performed by: OPTOMETRIST

## 2024-08-15 PROCEDURE — 99999 PR PBB SHADOW E&M-EST. PATIENT-LVL II: CPT | Mod: PBBFAC,,, | Performed by: OPTOMETRIST

## 2024-08-15 NOTE — PROGRESS NOTES
HPI     Annual Exam     Additional comments: DLE 6-2023           Comments    Pt here today for ocular health exam with refraction.   States no visual   changes or complaints.    Would like to try contacts again.   Did fitting last year but did not f/u   with it.    Wants to be able to wear contacts to work weekly.    Denies any headaches or eye pain.    (-) allergies / dry eyes  (-) gtts  (-) floaters or light flashes          Last edited by Denita Aguero on 8/15/2024  1:32 PM.            Assessment /Plan     For exam results, see Encounter Report.    Examination of eyes and vision    Refractive error    Lattice degeneration of peripheral retina, right      1. Examination of eyes and vision    2. Refractive error  Dispensed updated spectacle Rx. Discussed various spectacle lens options. Discussed adaptation period to new specs.     Discussed cls options -- consider daily disposable when ready  Declines fit today, will reach out when ready    3. Lattice degeneration of peripheral retina, right  Superior periphery OD  No holes/tears/breaks/RD  RTC immediately if any flashes, floaters, decreased vision, veiling of vision, or trauma occurs.

## 2024-09-19 ENCOUNTER — PATIENT MESSAGE (OUTPATIENT)
Dept: OPTOMETRY | Facility: CLINIC | Age: 39
End: 2024-09-19
Payer: COMMERCIAL

## 2024-09-26 ENCOUNTER — OFFICE VISIT (OUTPATIENT)
Dept: OBSTETRICS AND GYNECOLOGY | Facility: CLINIC | Age: 39
End: 2024-09-26
Payer: COMMERCIAL

## 2024-09-26 VITALS
BODY MASS INDEX: 23.98 KG/M2 | DIASTOLIC BLOOD PRESSURE: 80 MMHG | WEIGHT: 130.31 LBS | HEIGHT: 62 IN | SYSTOLIC BLOOD PRESSURE: 130 MMHG

## 2024-09-26 DIAGNOSIS — Z01.812 PRE-PROCEDURE LAB EXAM: Primary | ICD-10-CM

## 2024-09-26 DIAGNOSIS — R87.619 ABNORMAL CERVICAL PAPANICOLAOU SMEAR, UNSPECIFIED ABNORMAL PAP FINDING: ICD-10-CM

## 2024-09-26 LAB
B-HCG UR QL: NEGATIVE
CTP QC/QA: YES

## 2024-09-26 PROCEDURE — 99999 PR PBB SHADOW E&M-EST. PATIENT-LVL III: CPT | Mod: PBBFAC,,, | Performed by: STUDENT IN AN ORGANIZED HEALTH CARE EDUCATION/TRAINING PROGRAM

## 2024-09-26 NOTE — PROCEDURES
Colposcopy    Date/Time: 9/26/2024 1:40 PM    Performed by: Samara Gandara MD  Authorized by: Samara Gandara MD      Colposcopy Site:  Cervix  Acrowhite Lesion? Yes    Atypical Vessels: No    Transformation Zone Adequate?: Yes    Biopsy?: Yes         Location:  Cervix ((6 00))  ECC Performed?: Yes    LEEP Performed?: No     Patient tolerated the procedure well with no immediate complications.

## 2024-10-08 ENCOUNTER — OFFICE VISIT (OUTPATIENT)
Dept: OPTOMETRY | Facility: CLINIC | Age: 39
End: 2024-10-08
Payer: COMMERCIAL

## 2024-10-08 DIAGNOSIS — Z46.0 CONTACT LENS/GLASSES FITTING: Primary | ICD-10-CM

## 2024-10-08 PROCEDURE — 99499 UNLISTED E&M SERVICE: CPT | Mod: ,,, | Performed by: OPTOMETRIST

## 2024-10-08 PROCEDURE — 99999 PR PBB SHADOW E&M-EST. PATIENT-LVL II: CPT | Mod: PBBFAC,,, | Performed by: OPTOMETRIST

## 2024-10-08 PROCEDURE — 92310 CONTACT LENS FITTING OU: CPT | Mod: CSM,,, | Performed by: OPTOMETRIST

## 2024-10-08 NOTE — PROGRESS NOTES
HPI     Contact Lens Fit     Additional comments: DLE 8-2024           Comments    Pt here today for clfit -- new wearer.   Would like to try daily lenses   for distance OU.              Last edited by Denita Aguero on 10/8/2024  3:05 PM.            Assessment /Plan     For exam results, see Encounter Report.    There are no diagnoses linked to this encounter.  Re-refracted today  Discussed cls options -- previous biofinity felt heavy at end of day  Will trial daily disposable  Ordered b&l infuse for astigmatism  Return for dispense

## 2024-10-24 ENCOUNTER — OFFICE VISIT (OUTPATIENT)
Dept: OPTOMETRY | Facility: CLINIC | Age: 39
End: 2024-10-24
Payer: COMMERCIAL

## 2024-10-24 DIAGNOSIS — Z97.3 WEARS CONTACT LENSES: Primary | ICD-10-CM

## 2024-10-24 PROCEDURE — 99499 UNLISTED E&M SERVICE: CPT | Mod: S$GLB,,, | Performed by: OPTOMETRIST

## 2024-10-24 NOTE — PROGRESS NOTES
HPI    Pt here today for cls follow up.   Had pt insert trials OU.   States good   fit & comfort both physically & visually.  Last edited by Denita Aguero on 10/24/2024  4:04 PM.            Assessment /Plan     For exam results, see Encounter Report.    Wears contact lenses      Good clfu -- notes good comfort and vision  Dispensed CLs trials: b&l infuse. Daily wear only, dispose of daily.   Discussed proper hand hygiene and wear/care of lenses. Do not sleep/swim/shower in lenses.   Discontinue CL wear ASAP and RTC if any redness or discomfort occurs.   Try x few days, report back with progress

## 2024-11-29 ENCOUNTER — OFFICE VISIT (OUTPATIENT)
Dept: FAMILY MEDICINE | Facility: CLINIC | Age: 39
End: 2024-11-29
Payer: COMMERCIAL

## 2024-11-29 ENCOUNTER — HOSPITAL ENCOUNTER (OUTPATIENT)
Dept: RADIOLOGY | Facility: CLINIC | Age: 39
Discharge: HOME | End: 2024-11-29
Payer: COMMERCIAL

## 2024-11-29 ENCOUNTER — PATIENT MESSAGE (OUTPATIENT)
Dept: FAMILY MEDICINE | Facility: CLINIC | Age: 39
End: 2024-11-29

## 2024-11-29 VITALS
DIASTOLIC BLOOD PRESSURE: 70 MMHG | BODY MASS INDEX: 24.78 KG/M2 | WEIGHT: 134.69 LBS | OXYGEN SATURATION: 99 % | HEIGHT: 62 IN | HEART RATE: 68 BPM | SYSTOLIC BLOOD PRESSURE: 120 MMHG | RESPIRATION RATE: 18 BRPM

## 2024-11-29 DIAGNOSIS — G89.29 CHRONIC UPPER BACK PAIN: Primary | ICD-10-CM

## 2024-11-29 DIAGNOSIS — G89.29 CHRONIC UPPER BACK PAIN: ICD-10-CM

## 2024-11-29 DIAGNOSIS — M54.9 CHRONIC UPPER BACK PAIN: ICD-10-CM

## 2024-11-29 DIAGNOSIS — M54.9 CHRONIC UPPER BACK PAIN: Primary | ICD-10-CM

## 2024-11-29 PROCEDURE — 72040 X-RAY EXAM NECK SPINE 2-3 VW: CPT | Mod: TC,FY,PO

## 2024-11-29 PROCEDURE — 99999 PR PBB SHADOW E&M-EST. PATIENT-LVL IV: CPT | Mod: PBBFAC,,,

## 2024-11-29 PROCEDURE — 72040 X-RAY EXAM NECK SPINE 2-3 VW: CPT | Mod: 26,,, | Performed by: RADIOLOGY

## 2024-11-29 NOTE — PROGRESS NOTES
"Subjective:       Patient ID: Jennifer Tapia is a 39 y.o. female.    Chief Complaint: Establish Care and Back Pain    Back Pain        History of Present Illness    CHIEF COMPLAINT:  Patient presents today for back pain.    BACK PAIN:  She reports back pain that began after performing back squats in March. The pain is worse in the morning and is described as a pulling sensation, particularly when reaching or removing a sweater. It is bilateral but more severe on the right side. She describes it feeling like a bruise when touched, and sore on both sides. Pain is also experienced when pushing up against resistance and when scratching the lower back. She describes the sensation as feeling like 'a hammer hit the bone' when touched. She avoids overhead activities and exercises that require squeezing. She has stopped doing overhead lifts and now uses a belt for squats. She has tried biofreeze as well as chiropractor treatment for her symptoms without long lasting relief.       ROS:  Musculoskeletal: positive back pain          History reviewed. No pertinent past medical history.    Review of patient's allergies indicates:  No Known Allergies    No current outpatient medications on file.    Review of Systems   Constitutional:  Positive for activity change.   Musculoskeletal:  Positive for back pain.       Objective:      /70 (BP Location: Right arm, Patient Position: Sitting)   Pulse 68   Resp 18   Ht 5' 2" (1.575 m)   Wt 61.1 kg (134 lb 11.2 oz)   LMP 11/01/2024 (Exact Date)   SpO2 99%   BMI 24.64 kg/m²   Physical Exam  Vitals reviewed.   Constitutional:       General: She is not in acute distress.     Appearance: Normal appearance. She is not ill-appearing, toxic-appearing or diaphoretic.   HENT:      Head: Normocephalic.      Right Ear: External ear normal.      Left Ear: External ear normal.      Nose: Nose normal. No congestion or rhinorrhea.      Mouth/Throat:      Mouth: Mucous membranes are moist.      " Pharynx: Oropharynx is clear.   Eyes:      General: No scleral icterus.        Right eye: No discharge.         Left eye: No discharge.      Extraocular Movements: Extraocular movements intact.      Conjunctiva/sclera: Conjunctivae normal.   Cardiovascular:      Rate and Rhythm: Normal rate and regular rhythm.      Pulses: Normal pulses.      Heart sounds: Normal heart sounds. No murmur heard.     No friction rub. No gallop.   Pulmonary:      Effort: Pulmonary effort is normal. No respiratory distress.      Breath sounds: Normal breath sounds. No wheezing, rhonchi or rales.   Chest:      Chest wall: No tenderness.   Musculoskeletal:         General: Tenderness (Significant tenderness to light palpation of the vertebral prominens.) present. No swelling or deformity. Normal range of motion.      Cervical back: Normal range of motion.      Right lower leg: No edema.      Left lower leg: No edema.   Skin:     General: Skin is warm and dry.      Capillary Refill: Capillary refill takes less than 2 seconds.      Coloration: Skin is not jaundiced.      Findings: No bruising, erythema, lesion or rash.   Neurological:      Mental Status: She is alert and oriented to person, place, and time.      Gait: Gait normal.   Psychiatric:         Mood and Affect: Mood normal.         Behavior: Behavior normal.         Thought Content: Thought content normal.         Judgment: Judgment normal.             Assessment:       1. Chronic upper back pain          Assessment & Plan    IMPRESSION:  - Suspected potential C7 fracture due to reported mechanism of injury and significant tenderness on exam  - Considered cervical X-ray to evaluate for possible fracture  - Considered anti-inflammatory medication if imaging reveals no fracture    PLAN SUMMARY:  - Use squat belt for lower body exercises to avoid upper back pressure  - Continue avoiding overhead lifts and activities causing discomfort  - Referred to [facility] program for upper  back  - Cervical X-ray ordered: AP and lateral views, focusing on C7  - Follow up as needed  - Contact office if needed via message or phone    CERVICAL REGION DYSFUNCTION:  - Explained that C7 fractures are common, especially at the tip of the bone due to its structure.  - Cervical X-ray ordered: AP and lateral views, focusing on C7.    CERVICALGIA AND UPPER BACK PAIN:  - Discussed how certain movements can exacerbate upper back pain.  - Patient to continue avoiding overhead lifts and activities that cause discomfort.  - Patient to use squat belt for lower body exercises to avoid pressure on upper back.  - Referred to [facility] program for upper back.    FOLLOW-UP:  - Contact the office if needed via message or phone.  - Follow up as needed.          Plan:       Chronic upper back pain  -     X-Ray Cervical Spine AP And Lateral; Future; Expected date: 11/29/2024  -     Ambulatory referral/consult to [facility]; Future; Expected date: 12/06/2024                   Prasad Ayala PA-C  Family Medicine Physician Assistant       Future Appointments       Date Provider Specialty Appt Notes    11/29/2024  Radiology Chronic upper back pain    6/30/2025 Prasad Ayala PA-C Family Medicine Annual exam               I spent a total of 20 minutes on the day of the visit.This includes face to face time and non-face to face time preparing to see the patient (eg, review of tests), obtaining and/or reviewing separately obtained history, documenting clinical information in the electronic or other health record, independently interpreting results and communicating results to the patient/family/caregiver, or care coordinator.      We have addressed [3] Low: 2 or more self-limited or minor problems / 1 stable chronic illness / 1 acute, uncomplicated illness or injury  The complexity of the data reviewed and analyzed for this visit was [3] Limited (Reviewed prior external note, ordered unique testing or reviewed the  results of each unique test)   The risk of complications and/or morbidity or mortality are [3] Low risk   The level of Medical Decision Making for this visit is [3] Low    This note was generated with the assistance of ambient listening technology. Verbal consent was obtained by the patient and accompanying visitor(s) for the recording of patient appointment to facilitate this note. I attest to having reviewed and edited the generated note for accuracy, though some syntax or spelling errors may persist. Please contact the author of this note for any clarification.

## 2025-08-19 ENCOUNTER — LAB VISIT (OUTPATIENT)
Dept: LAB | Facility: HOSPITAL | Age: 40
End: 2025-08-19
Payer: COMMERCIAL

## 2025-08-19 ENCOUNTER — OFFICE VISIT (OUTPATIENT)
Dept: FAMILY MEDICINE | Facility: CLINIC | Age: 40
End: 2025-08-19
Payer: COMMERCIAL

## 2025-08-19 ENCOUNTER — OFFICE VISIT (OUTPATIENT)
Dept: OPTOMETRY | Facility: CLINIC | Age: 40
End: 2025-08-19
Payer: COMMERCIAL

## 2025-08-19 VITALS
BODY MASS INDEX: 24.67 KG/M2 | OXYGEN SATURATION: 100 % | SYSTOLIC BLOOD PRESSURE: 124 MMHG | DIASTOLIC BLOOD PRESSURE: 62 MMHG | WEIGHT: 134.06 LBS | HEIGHT: 62 IN | RESPIRATION RATE: 16 BRPM | HEART RATE: 67 BPM

## 2025-08-19 DIAGNOSIS — Z00.00 WELLNESS EXAMINATION: Primary | ICD-10-CM

## 2025-08-19 DIAGNOSIS — H35.411 LATTICE DEGENERATION OF PERIPHERAL RETINA, RIGHT: ICD-10-CM

## 2025-08-19 DIAGNOSIS — H52.7 REFRACTIVE ERROR: ICD-10-CM

## 2025-08-19 DIAGNOSIS — R68.89 COLD INTOLERANCE: ICD-10-CM

## 2025-08-19 DIAGNOSIS — Z00.00 WELLNESS EXAMINATION: ICD-10-CM

## 2025-08-19 DIAGNOSIS — Z01.00 EXAMINATION OF EYES AND VISION: Primary | ICD-10-CM

## 2025-08-19 LAB
ABSOLUTE EOSINOPHIL (OHS): 0.15 K/UL
ABSOLUTE MONOCYTE (OHS): 0.57 K/UL (ref 0.3–1)
ABSOLUTE NEUTROPHIL COUNT (OHS): 3.42 K/UL (ref 1.8–7.7)
ALBUMIN SERPL BCP-MCNC: 4.3 G/DL (ref 3.5–5.2)
ALP SERPL-CCNC: 39 UNIT/L (ref 40–150)
ALT SERPL W/O P-5'-P-CCNC: <8 UNIT/L (ref 0–55)
ANION GAP (OHS): 10 MMOL/L (ref 8–16)
AST SERPL-CCNC: 22 UNIT/L (ref 0–50)
BASOPHILS # BLD AUTO: 0.1 K/UL
BASOPHILS NFR BLD AUTO: 1.7 %
BILIRUB SERPL-MCNC: 0.6 MG/DL (ref 0.1–1)
BUN SERPL-MCNC: 11 MG/DL (ref 6–20)
CALCIUM SERPL-MCNC: 8.8 MG/DL (ref 8.7–10.5)
CHLORIDE SERPL-SCNC: 107 MMOL/L (ref 95–110)
CHOLEST SERPL-MCNC: 156 MG/DL (ref 120–199)
CHOLEST/HDLC SERPL: 2.6 {RATIO} (ref 2–5)
CO2 SERPL-SCNC: 22 MMOL/L (ref 23–29)
CREAT SERPL-MCNC: 0.7 MG/DL (ref 0.5–1.4)
ERYTHROCYTE [DISTWIDTH] IN BLOOD BY AUTOMATED COUNT: 12.5 % (ref 11.5–14.5)
FERRITIN SERPL-MCNC: 22 NG/ML (ref 20–300)
GFR SERPLBLD CREATININE-BSD FMLA CKD-EPI: >60 ML/MIN/1.73/M2
GLUCOSE SERPL-MCNC: 90 MG/DL (ref 70–110)
HCT VFR BLD AUTO: 36.7 % (ref 37–48.5)
HDLC SERPL-MCNC: 61 MG/DL (ref 40–75)
HDLC SERPL: 39.1 % (ref 20–50)
HGB BLD-MCNC: 11.8 GM/DL (ref 12–16)
IMM GRANULOCYTES # BLD AUTO: 0.01 K/UL (ref 0–0.04)
IMM GRANULOCYTES NFR BLD AUTO: 0.2 % (ref 0–0.5)
IRON SATN MFR SERPL: 14 % (ref 20–50)
IRON SERPL-MCNC: 56 UG/DL (ref 30–160)
LDLC SERPL CALC-MCNC: 84.8 MG/DL (ref 63–159)
LYMPHOCYTES # BLD AUTO: 1.73 K/UL (ref 1–4.8)
MCH RBC QN AUTO: 30.6 PG (ref 27–31)
MCHC RBC AUTO-ENTMCNC: 32.2 G/DL (ref 32–36)
MCV RBC AUTO: 95 FL (ref 82–98)
NONHDLC SERPL-MCNC: 95 MG/DL
NUCLEATED RBC (/100WBC) (OHS): 0 /100 WBC
PLATELET # BLD AUTO: 299 K/UL (ref 150–450)
PMV BLD AUTO: 9.8 FL (ref 9.2–12.9)
POTASSIUM SERPL-SCNC: 3.8 MMOL/L (ref 3.5–5.1)
PROT SERPL-MCNC: 7 GM/DL (ref 6–8.4)
RBC # BLD AUTO: 3.85 M/UL (ref 4–5.4)
RELATIVE EOSINOPHIL (OHS): 2.5 %
RELATIVE LYMPHOCYTE (OHS): 28.9 % (ref 18–48)
RELATIVE MONOCYTE (OHS): 9.5 % (ref 4–15)
RELATIVE NEUTROPHIL (OHS): 57.2 % (ref 38–73)
SODIUM SERPL-SCNC: 139 MMOL/L (ref 136–145)
TIBC SERPL-MCNC: 392 UG/DL (ref 250–450)
TRANSFERRIN SERPL-MCNC: 265 MG/DL (ref 200–375)
TRIGL SERPL-MCNC: 51 MG/DL (ref 30–150)
TSH SERPL-ACNC: 0.96 UIU/ML (ref 0.4–4)
VIT B12 SERPL-MCNC: 527 PG/ML (ref 210–950)
WBC # BLD AUTO: 5.98 K/UL (ref 3.9–12.7)

## 2025-08-19 PROCEDURE — 92014 COMPRE OPH EXAM EST PT 1/>: CPT | Mod: S$GLB,,, | Performed by: OPTOMETRIST

## 2025-08-19 PROCEDURE — 3074F SYST BP LT 130 MM HG: CPT | Mod: CPTII,S$GLB,,

## 2025-08-19 PROCEDURE — 84443 ASSAY THYROID STIM HORMONE: CPT

## 2025-08-19 PROCEDURE — 80061 LIPID PANEL: CPT

## 2025-08-19 PROCEDURE — 1160F RVW MEDS BY RX/DR IN RCRD: CPT | Mod: CPTII,S$GLB,,

## 2025-08-19 PROCEDURE — 3078F DIAST BP <80 MM HG: CPT | Mod: CPTII,S$GLB,,

## 2025-08-19 PROCEDURE — 3008F BODY MASS INDEX DOCD: CPT | Mod: CPTII,S$GLB,,

## 2025-08-19 PROCEDURE — 99395 PREV VISIT EST AGE 18-39: CPT | Mod: S$GLB,,,

## 2025-08-19 PROCEDURE — 83540 ASSAY OF IRON: CPT

## 2025-08-19 PROCEDURE — 85025 COMPLETE CBC W/AUTO DIFF WBC: CPT

## 2025-08-19 PROCEDURE — 82607 VITAMIN B-12: CPT

## 2025-08-19 PROCEDURE — 1159F MED LIST DOCD IN RCRD: CPT | Mod: CPTII,S$GLB,,

## 2025-08-19 PROCEDURE — 99999 PR PBB SHADOW E&M-EST. PATIENT-LVL III: CPT | Mod: PBBFAC,,,

## 2025-08-19 PROCEDURE — 92015 DETERMINE REFRACTIVE STATE: CPT | Mod: S$GLB,,, | Performed by: OPTOMETRIST

## 2025-08-19 PROCEDURE — 80053 COMPREHEN METABOLIC PANEL: CPT

## 2025-08-19 PROCEDURE — 82728 ASSAY OF FERRITIN: CPT

## 2025-08-19 PROCEDURE — 99999 PR PBB SHADOW E&M-EST. PATIENT-LVL II: CPT | Mod: PBBFAC,,, | Performed by: OPTOMETRIST

## 2025-08-19 PROCEDURE — 36415 COLL VENOUS BLD VENIPUNCTURE: CPT | Mod: PO

## 2025-08-20 ENCOUNTER — OFFICE VISIT (OUTPATIENT)
Dept: OBSTETRICS AND GYNECOLOGY | Facility: CLINIC | Age: 40
End: 2025-08-20
Payer: COMMERCIAL

## 2025-08-20 VITALS
BODY MASS INDEX: 25.8 KG/M2 | WEIGHT: 140.19 LBS | DIASTOLIC BLOOD PRESSURE: 60 MMHG | SYSTOLIC BLOOD PRESSURE: 112 MMHG | HEIGHT: 62 IN

## 2025-08-20 DIAGNOSIS — Z01.419 WELL WOMAN EXAM: Primary | ICD-10-CM

## 2025-08-20 PROCEDURE — 1159F MED LIST DOCD IN RCRD: CPT | Mod: CPTII,S$GLB,, | Performed by: STUDENT IN AN ORGANIZED HEALTH CARE EDUCATION/TRAINING PROGRAM

## 2025-08-20 PROCEDURE — 99999 PR PBB SHADOW E&M-EST. PATIENT-LVL III: CPT | Mod: PBBFAC,,, | Performed by: STUDENT IN AN ORGANIZED HEALTH CARE EDUCATION/TRAINING PROGRAM

## 2025-08-20 PROCEDURE — 99395 PREV VISIT EST AGE 18-39: CPT | Mod: S$GLB,,, | Performed by: STUDENT IN AN ORGANIZED HEALTH CARE EDUCATION/TRAINING PROGRAM

## 2025-08-20 PROCEDURE — 3074F SYST BP LT 130 MM HG: CPT | Mod: CPTII,S$GLB,, | Performed by: STUDENT IN AN ORGANIZED HEALTH CARE EDUCATION/TRAINING PROGRAM

## 2025-08-20 PROCEDURE — 87624 HPV HI-RISK TYP POOLED RSLT: CPT | Performed by: STUDENT IN AN ORGANIZED HEALTH CARE EDUCATION/TRAINING PROGRAM

## 2025-08-20 PROCEDURE — 3008F BODY MASS INDEX DOCD: CPT | Mod: CPTII,S$GLB,, | Performed by: STUDENT IN AN ORGANIZED HEALTH CARE EDUCATION/TRAINING PROGRAM

## 2025-08-20 PROCEDURE — 3078F DIAST BP <80 MM HG: CPT | Mod: CPTII,S$GLB,, | Performed by: STUDENT IN AN ORGANIZED HEALTH CARE EDUCATION/TRAINING PROGRAM
